# Patient Record
Sex: FEMALE | Race: WHITE | NOT HISPANIC OR LATINO | Employment: FULL TIME | URBAN - METROPOLITAN AREA
[De-identification: names, ages, dates, MRNs, and addresses within clinical notes are randomized per-mention and may not be internally consistent; named-entity substitution may affect disease eponyms.]

---

## 2022-12-06 ENCOUNTER — HOSPITAL ENCOUNTER (EMERGENCY)
Facility: HOSPITAL | Age: 37
Discharge: HOME | End: 2022-12-06
Attending: STUDENT IN AN ORGANIZED HEALTH CARE EDUCATION/TRAINING PROGRAM | Admitting: STUDENT IN AN ORGANIZED HEALTH CARE EDUCATION/TRAINING PROGRAM
Payer: COMMERCIAL

## 2022-12-06 VITALS
HEART RATE: 84 BPM | SYSTOLIC BLOOD PRESSURE: 131 MMHG | BODY MASS INDEX: 35.62 KG/M2 | RESPIRATION RATE: 20 BRPM | HEIGHT: 66 IN | WEIGHT: 221.6 LBS | DIASTOLIC BLOOD PRESSURE: 80 MMHG | TEMPERATURE: 98.6 F | OXYGEN SATURATION: 97 %

## 2022-12-06 DIAGNOSIS — H66.001 NON-RECURRENT ACUTE SUPPURATIVE OTITIS MEDIA OF RIGHT EAR WITHOUT SPONTANEOUS RUPTURE OF TYMPANIC MEMBRANE: ICD-10-CM

## 2022-12-06 DIAGNOSIS — H60.391 INFECTIVE OTITIS EXTERNA, RIGHT: ICD-10-CM

## 2022-12-06 DIAGNOSIS — J10.1 INFLUENZA A: ICD-10-CM

## 2022-12-06 LAB
FLUAV RNA SPEC QL NAA+PROBE: POSITIVE
FLUBV RNA RESP QL NAA+PROBE: NEGATIVE
RSV RNA SPEC NAA+PROBE: NEGATIVE
SARS-COV-2 RNA RESP QL NAA+PROBE: NEGATIVE

## 2022-12-06 PROCEDURE — 99283 EMERGENCY DEPT VISIT LOW MDM: CPT

## 2022-12-06 PROCEDURE — 250N000013 HC RX MED GY IP 250 OP 250 PS 637: Performed by: STUDENT IN AN ORGANIZED HEALTH CARE EDUCATION/TRAINING PROGRAM

## 2022-12-06 PROCEDURE — 87637 SARSCOV2&INF A&B&RSV AMP PRB: CPT | Performed by: STUDENT IN AN ORGANIZED HEALTH CARE EDUCATION/TRAINING PROGRAM

## 2022-12-06 RX ORDER — IBUPROFEN 600 MG/1
600 TABLET, FILM COATED ORAL ONCE
Status: COMPLETED | OUTPATIENT
Start: 2022-12-06 | End: 2022-12-06

## 2022-12-06 RX ORDER — CIPROFLOXACIN AND DEXAMETHASONE 3; 1 MG/ML; MG/ML
4 SUSPENSION/ DROPS AURICULAR (OTIC) 2 TIMES DAILY
Qty: 7.5 ML | Refills: 0 | Status: SHIPPED | OUTPATIENT
Start: 2022-12-06 | End: 2022-12-13

## 2022-12-06 RX ORDER — CIPROFLOXACIN AND DEXAMETHASONE 3; 1 MG/ML; MG/ML
4 SUSPENSION/ DROPS AURICULAR (OTIC) 2 TIMES DAILY
Status: DISCONTINUED | OUTPATIENT
Start: 2022-12-06 | End: 2022-12-06 | Stop reason: HOSPADM

## 2022-12-06 RX ADMIN — IBUPROFEN 600 MG: 600 TABLET, FILM COATED ORAL at 05:37

## 2022-12-06 RX ADMIN — CIPROFLOXACIN AND DEXAMETHASONE 4 DROP: 3; 1 SUSPENSION/ DROPS AURICULAR (OTIC) at 05:53

## 2022-12-06 RX ADMIN — AMOXICILLIN AND CLAVULANATE POTASSIUM 1 TABLET: 875; 125 TABLET, FILM COATED ORAL at 05:37

## 2022-12-06 NOTE — ED TRIAGE NOTES
Patient arrives to triage from home with chief complaint of right ear pain and fever since Friday.  Patient's last dose of Tylenol was yesterday around 2000.  Patient reports difficulty sleeping on right side due to increased pain.   used in triage on video remote system.  Dr. Parada in triage as well.  Alert and oriented x4.

## 2022-12-06 NOTE — Clinical Note
Ivonne Johnston was seen and treated in our emergency department on 12/6/2022.  She may return to work on 12/12/2022.       If you have any questions or concerns, please don't hesitate to call.      Ben Parada MD

## 2022-12-06 NOTE — ED PROVIDER NOTES
EMERGENCY DEPARTMENT ENCOUNTER       ED Course & Medical Decision Making     5:18 AM I met with patient for initial interview and encounter. PPE worn includes N95 mask and exam gloves.   6:25 AM I updated the patient about lab results.     Final Impression  37 year old female presents for evaluation of 4 days of fever and right ear pain.  On exam patient has moderate erythema and some edema of the right external canal as well as bulging and erythema of the TM suggestive of otitis media with some possible component of otitis externa.  States she has had ear infections in the past, though not since she was about 16 years old.  Checked in with her fiancé, they may have both been exposed to someone with a URI last week.  We will treat for otitis media and otitis externa with Augmentin and Ciprodex, recommend Tylenol and ibuprofen for pain control.     Patient's viral swab returned positive for influenza A.  Sounds like several of the people in the household have also had flulike symptoms over the last few days including her fiancé who is here with her.  Discussed this finding with her, will provide a work note for the next 5 days off of work.  Discussed symptomatic management of flu symptoms at home.    Prior to making a final disposition on this patient the results of patient's tests and other diagnostic studies were discussed with the patient. All questions were answered. Patient expressed understanding of the plan and was amenable to it.    Medications   ciprofloxacin-dexamethasone (CIPRODEX) 0.3-0.1 % otic suspension 4 drop (4 drops Right Ear Given 12/6/22 0553)   amoxicillin-clavulanate (AUGMENTIN) 875-125 MG per tablet 1 tablet (1 tablet Oral Given 12/6/22 0537)   ibuprofen (ADVIL/MOTRIN) tablet 600 mg (600 mg Oral Given 12/6/22 0537)     Final Impression     1. Non-recurrent acute suppurative otitis media of right ear without spontaneous rupture of tympanic membrane    2. Infective otitis externa, right    3.  "Influenza A      Chief Complaint     Chief Complaint   Patient presents with     Fever     Otalgia     HPI     Ivonne Johnston is a 37 year old female who presents for evaluation of fever and otalgia.     The patient presents with fever and right otalgia since 12/2/22 (~4 days ago). She states being around people with a \"stomach bug\". Took over the counter tylenol with minimal relief. The patient also complains of cough. She cannot sleep due to pain. She notes to have frequent ear infection at 16 years old, she haven't had any ear infections since. Denies any other complaints or concerns at the moment.     IMireya Her am serving as a scribe to document services personally performed by Dr. Ben Parada MD, based on my observation and the provider's statements to me. I, Dr. Ben Parada MD attest that Mireya Her is acting in a scribe capacity, has observed my performance of the services and has documented them in accordance with my direction.    Past Medical History     History reviewed. No pertinent past medical history.  Past Surgical History:   Procedure Laterality Date     LYMPH NODE BIOPSY      benign      Family History   Problem Relation Age of Onset     Cancer Mother         thyroid     Osteoarthritis Mother      Hypertension Mother      Diabetes Father       Social History     Tobacco Use     Smoking status: Former   Substance Use Topics     Alcohol use: Yes     Comment: Alcoholic Drinks/day: rarely     Drug use: Yes     Types: Marijuana     Comment: Drug use: occ     No Known Allergies    Relevant past medical, surgical, family and social history as documented above, has been reviewed and discussed with patient. No changes or additions, unless otherwise noted in the HPI.    Current Medications     amoxicillin-clavulanate (AUGMENTIN) 875-125 MG tablet  ciprofloxacin-dexamethasone (CIPRODEX) 0.3-0.1 % otic suspension  calcium, as carbonate, (TUMS) 200 mg calcium (500 mg) chewable " "tablet  INTRAUTERINE DEVICE, IUD, UTRN  omeprazole (PRILOSEC) 20 MG capsule  oxyCODONE-acetaminophen (PERCOCET) 5-325 mg per tablet        Review of Systems     Constitutional: Positive for fever.    HENT: Denies sore throat, or neck pain. Positive for right ear pain.       Respiratory: Denies shortness of breath. Positive for cough.      Cardiovascular: Denies chest pain    Remainder of systems reviewed, unless noted in HPI all others negative.    Physical Exam     /80   Pulse 84   Temp 98.6  F (37  C) (Oral)   Resp 20   Ht 1.676 m (5' 6\")   Wt 100.5 kg (221 lb 9.6 oz)   SpO2 97%   BMI 35.77 kg/m    Constitutional: Awake, alert, in no acute distress.      Head: Normocephalic, atraumatic.      ENT: Mucous membranes moist.  Left ear canal and TM appears normal.  Right external canal with moderate erythema and some mild edema, TM is erythematous and slightly bulging  Eyes: PERRL, EOMI, Conjunctiva normal.      Respiratory: Respirations even, unlabored, in no acute respiratory distress.      Cardiovascular: Regular rate and rhythm. +2 radial pulses, equal bilaterally. No pitting edema.          Musculoskeletal: Moves all 4 extremities equally, strength symmetrical on bilateral uppers and lowers.      Integument: Warm, dry. No rash. No bruising or petechiae.      Neurologic: Alert & oriented x 3.  Chronically deaf, communicates by sign language. Grossly normal motor and sensory function. No focal deficits noted.    Psychiatric: Normal mood and affect    Labs & Imaging     Results for orders placed or performed during the hospital encounter of 12/06/22   Symptomatic; Unknown Influenza A/B & SARS-CoV2 (COVID-19) Virus PCR Multiplex Nose    Specimen: Nose; Swab   Result Value Ref Range    Influenza A PCR Positive (A) Negative    Influenza B PCR Negative Negative    RSV PCR Negative Negative    SARS CoV2 PCR Negative Negative            Ben Parada MD  12/06/22 0636    "

## 2022-12-06 NOTE — DISCHARGE INSTRUCTIONS
For your pain/fevers we recommend trying;  Tylenol 1,000mg every 6 hours (as needed), up to 4,000mg/day  Ibuprofen 600 to 800mg every 6 hours (as needed), up to 3,200mg/day